# Patient Record
Sex: MALE | Race: WHITE | URBAN - METROPOLITAN AREA
[De-identification: names, ages, dates, MRNs, and addresses within clinical notes are randomized per-mention and may not be internally consistent; named-entity substitution may affect disease eponyms.]

---

## 2017-12-04 ENCOUNTER — NURSE TRIAGE (OUTPATIENT)
Dept: NURSING | Facility: CLINIC | Age: 32
End: 2017-12-04

## 2017-12-05 NOTE — TELEPHONE ENCOUNTER
Caller had a inversion injury to ankle while walking on sidewalk. Lateral ankle swollen and weight bearing is moderately painful.  Triage protocol reviewed  Advised in home care; RICE protocol ;Advised to be seen in clinic if not improving or worsening per protocol     Additional Information    [1] Minor injury or pain from twisting or over-stretching AND [2] walks normally (all triage questions negative)    Protocols used: FOOT AND ANKLE INJURY-ADULT-  Shannan Bertrand RN  [facility]